# Patient Record
(demographics unavailable — no encounter records)

---

## 2024-12-23 NOTE — IMAGING
[de-identified] : The patient is a well appearing 78 year old male of their stated age. Patient ambulates with an ANTALGIC gait. Negative straight leg raise bilateral   General: in no acute distress, seated comfortably, moving easily Skin: No discoloration, rashes; on palpation skin is dry, Neuro: Normal sensation all dermatomes, motor all myotomes Vascular: Normal pulses, no edema, normal temperature Coordination and balance: Normal Psych: normal mood and affect, non pressured speech, alert and oriented x3   BILATERAL Knee:                        ROM:  3-120 degrees   Lachman: Negative Pivot Shift: Negative Anterior Drawer: Negative Posterior Drawer / Sag: Negative Varus Stress 0 degrees: Stable Varus Stress 30 degrees: Stable Valgus Stress 0 degrees: Stable Valgus Stress 30 degrees: Stable Medial Harley: Positive Lateral Harley: Negative Patella Glide: 2+ Patella Apprehension: Negative Patella Grind: Positive   Palpation: Medial Joint Line: TTP Lateral Joint Line: TTP Medial Collateral Ligament: Nontender Lateral Collateral Ligament/PLC: Nontender Distal Femur: Nontender Proximal Tibia: Nontender Tibial Tubercle: Nontender Distal Pole Patella: Nontender Quadriceps Tendon: Nontender &  Intact Patella Tendon: Nontender &  Intact Medial Distal Hamstring/PES: Nontender Lateral Distal Hamstring: Nontender & Stable Iliotibial Band: Nontender Medial Patellofemoral Ligament: Nontender Adductor: Nontender Proximal GSC-Plantaris: Nontender Calf: Supple & Nontender   Inspection: Deformity: No Erythema: No Ecchymosis: No Abrasions: No Effusion: Moderate Prepatellar Bursitis: No   Neurologic Exam: Sensation L4-S1: Grossly Intact   Motor Exam: Quadriceps: 5 out of 5 Hamstrings: 5 out of 5 EHL: 5 out of 5 FHL: 5 out of 5 TA: 5 out of 5 GS: 5 out of 5   Circulatory/Pulses: Dorsalis Pedis: 2+ Posterior Tibialis: 2+     X-RAYS of the BILATERAL KNEE (4 views including AP, Merchant, Tunnel, Lateral): moderate osteoarthritic changes, with spurring, joint space narrowing, subchondral cysts; no fracture or dislocation

## 2024-12-23 NOTE — DISCUSSION/SUMMARY
[de-identified] : Instructions: Dx / Natural History The patient was advised of the diagnosis.  The natural history of the pathology was explained in full to the patient in layman's terms.  Several different treatment options were discussed and explained in full to the patient including the risks and benefits of both surgical and non-surgical treatments.  All questions and concerns were answered.   RICE I explained to the patient that rest, ice, compression, and elevation would benefit them.  They may return to activity after follow-up or when they no longer have any pain.  NSAIDs - OTC Patient is to begin over the counter oral anti-inflammatory medications on an as needed basis, as long as there are no medical contraindications.  Patient is counseled on possible GI and blood pressure side effects.  Pain Guide Activities The patient was advised to let pain guide the gradual advancement of activities.  Icing The patient was advised to apply ice (wrapped in a towel or protective covering) to the area daily (20 minutes at a time, 2-4X/day).  All of the patient's questions were answered to His satisfaction. Diagnoses and potential treatments were reviewed. He agreed with the plan and would like to move forward with it.  1. Euflexxa #1 bilateral knees tolerated well.  2. Cryotherapy  Follow up in 1 week for inj #2

## 2024-12-23 NOTE — HISTORY OF PRESENT ILLNESS
[de-identified] : 12/23/2024: Pt here today for follow-up for bilateral knee pain. Pain and symptoms are the same. Patient requesting to proceed with injection today.   2/5/24: Patient here for follow up of B/L knees and Euflexxa inj #2 today.   1/29/2024: The patient is a 78 year old RHD M who presents today complaining of carmelina knee pain. Date of Injury/Onset: 2+ years Pain: At Rest: 1/10 With Activity: 7-8/10 Mechanism of injury: No specific cause of injury/ trauma Quality of symptoms: Sharp Improves with: Gel injection/ NSAIDS Worse with: Sitting Treatment/ Imaging/ Studies since last visit: None Occupation: Works 1x day a week. operations Additional Information: Finished Euflexxa series on on 8/28/23 8/28/2023: Euflexxa #3 Bilateral Knee  8/21/2024: Euflexxa #2 Bilateral Knee.  8/14/2024: The patient is a 78 year old RHD M who presents today complaining of carmelina knee pain. Date of Injury/Onset: 2+ years Pain: At Rest: 1/10 With Activity: 7-8/10 Mechanism of injury: No specific cause of injury/ trauma Quality of symptoms: Sharp Improves with: Gel injection/ NSAIDS Worse with: Sitting Prior treatment/ Imaging: Gel Injection/ Last seen 2/14/23 for right knee only. Occupation: Works 1x day a week. operations Additional Information: [None]

## 2024-12-23 NOTE — PROCEDURE
[Large Joint Injection] : Large joint injection [Bilateral] : bilaterally of the [Knee] : knee [Pain] : pain [Inflammation] : inflammation [X-ray evidence of Osteoarthritis on this or prior visit] : x-ray evidence of Osteoarthritis on this or prior visit [Alcohol] : alcohol [Betadine] : betadine [Ethyl Chloride sprayed topically] : ethyl chloride sprayed topically [Sterile technique used] : sterile technique used [Euflexxa(20mg)] : 20mg of Euflexxa [] : Patient tolerated procedure well [Call if redness, pain or fever occur] : call if redness, pain or fever occur [Apply ice for 15min out of every hour for the next 12-24 hours as tolerated] : apply ice for 15 minutes out of every hour for the next 12-24 hours as tolerated [Previous OTC use and PT nontherapeutic] : patient has tried OTC's including aspirin, Ibuprofen, Aleve, etc or prescription NSAIDS, and/or exercises at home and/or physical therapy without satisfactory response [Patient had decreased mobility in the joint] : patient had decreased mobility in the joint [Risks, benefits, alternatives discussed / Verbal consent obtained] : the risks benefits, and alternatives have been discussed, and verbal consent was obtained [Altered anatomic landmarks d/t erosive arthritis] : altered anatomic landmarks d/t erosive arthritis [All ultrasound images have been permanently captured and stored accordingly in our picture archiving and communication system] : All ultrasound images have been permanently captured and stored accordingly in our picture archiving and communication system [#1] : series #1

## 2025-01-06 NOTE — HISTORY OF PRESENT ILLNESS
[de-identified] : 01/06/2025 : Patient is presenting today for f/u bilateral knees. Euflexxa Injection # 2 today. No new issues. Patient denies any numbness/tingling/fevers/chills.  12/23/2024: Pt here today for follow-up for bilateral knee pain. Pain and symptoms are the same. Patient requesting to proceed with injection today.   2/5/24: Patient here for follow up of B/L knees and Euflexxa inj #2 today.   1/29/2024: The patient is a 78 year old RHD M who presents today complaining of carmelina knee pain. Date of Injury/Onset: 2+ years Pain: At Rest: 1/10 With Activity: 7-8/10 Mechanism of injury: No specific cause of injury/ trauma Quality of symptoms: Sharp Improves with: Gel injection/ NSAIDS Worse with: Sitting Treatment/ Imaging/ Studies since last visit: None Occupation: Works 1x day a week. operations Additional Information: Finished Euflexxa series on on 8/28/23 8/28/2023: Euflexxa #3 Bilateral Knee  8/21/2024: Euflexxa #2 Bilateral Knee.  8/14/2024: The patient is a 78 year old RHD M who presents today complaining of carmelina knee pain. Date of Injury/Onset: 2+ years Pain: At Rest: 1/10 With Activity: 7-8/10 Mechanism of injury: No specific cause of injury/ trauma Quality of symptoms: Sharp Improves with: Gel injection/ NSAIDS Worse with: Sitting Prior treatment/ Imaging: Gel Injection/ Last seen 2/14/23 for right knee only. Occupation: Works 1x day a week. operations Additional Information: [None]

## 2025-01-06 NOTE — PROCEDURE
[Large Joint Injection] : Large joint injection [Bilateral] : bilaterally of the [Knee] : knee [Pain] : pain [Inflammation] : inflammation [X-ray evidence of Osteoarthritis on this or prior visit] : x-ray evidence of Osteoarthritis on this or prior visit [Alcohol] : alcohol [Betadine] : betadine [Ethyl Chloride sprayed topically] : ethyl chloride sprayed topically [Sterile technique used] : sterile technique used [Euflexxa(20mg)] : 20mg of Euflexxa [#2] : series #2 [] : Patient tolerated procedure well [Call if redness, pain or fever occur] : call if redness, pain or fever occur [Apply ice for 15min out of every hour for the next 12-24 hours as tolerated] : apply ice for 15 minutes out of every hour for the next 12-24 hours as tolerated [Previous OTC use and PT nontherapeutic] : patient has tried OTC's including aspirin, Ibuprofen, Aleve, etc or prescription NSAIDS, and/or exercises at home and/or physical therapy without satisfactory response [Patient had decreased mobility in the joint] : patient had decreased mobility in the joint [Risks, benefits, alternatives discussed / Verbal consent obtained] : the risks benefits, and alternatives have been discussed, and verbal consent was obtained [Altered anatomic landmarks d/t erosive arthritis] : altered anatomic landmarks d/t erosive arthritis [All ultrasound images have been permanently captured and stored accordingly in our picture archiving and communication system] : All ultrasound images have been permanently captured and stored accordingly in our picture archiving and communication system

## 2025-01-06 NOTE — IMAGING
[de-identified] : The patient is a well appearing 78 year old male of their stated age. Patient ambulates with an ANTALGIC gait. Negative straight leg raise bilateral   General: in no acute distress, seated comfortably, moving easily Skin: No discoloration, rashes; on palpation skin is dry, Neuro: Normal sensation all dermatomes, motor all myotomes Vascular: Normal pulses, no edema, normal temperature Coordination and balance: Normal Psych: normal mood and affect, non pressured speech, alert and oriented x3   BILATERAL Knee:                        ROM:  3-120 degrees   Lachman: Negative Pivot Shift: Negative Anterior Drawer: Negative Posterior Drawer / Sag: Negative Varus Stress 0 degrees: Stable Varus Stress 30 degrees: Stable Valgus Stress 0 degrees: Stable Valgus Stress 30 degrees: Stable Medial Harley: Positive Lateral Harley: Negative Patella Glide: 2+ Patella Apprehension: Negative Patella Grind: Positive   Palpation: Medial Joint Line: TTP Lateral Joint Line: TTP Medial Collateral Ligament: Nontender Lateral Collateral Ligament/PLC: Nontender Distal Femur: Nontender Proximal Tibia: Nontender Tibial Tubercle: Nontender Distal Pole Patella: Nontender Quadriceps Tendon: Nontender &  Intact Patella Tendon: Nontender &  Intact Medial Distal Hamstring/PES: Nontender Lateral Distal Hamstring: Nontender & Stable Iliotibial Band: Nontender Medial Patellofemoral Ligament: Nontender Adductor: Nontender Proximal GSC-Plantaris: Nontender Calf: Supple & Nontender   Inspection: Deformity: No Erythema: No Ecchymosis: No Abrasions: No Effusion: Moderate Prepatellar Bursitis: No   Neurologic Exam: Sensation L4-S1: Grossly Intact   Motor Exam: Quadriceps: 5 out of 5 Hamstrings: 5 out of 5 EHL: 5 out of 5 FHL: 5 out of 5 TA: 5 out of 5 GS: 5 out of 5   Circulatory/Pulses: Dorsalis Pedis: 2+ Posterior Tibialis: 2+     X-RAYS of the BILATERAL KNEE (4 views including AP, Merchant, Tunnel, Lateral): moderate osteoarthritic changes, with spurring, joint space narrowing, subchondral cysts; no fracture or dislocation

## 2025-01-06 NOTE — DISCUSSION/SUMMARY
[de-identified] : Instructions: Dx / Natural History The patient was advised of the diagnosis.  The natural history of the pathology was explained in full to the patient in layman's terms.  Several different treatment options were discussed and explained in full to the patient including the risks and benefits of both surgical and non-surgical treatments.  All questions and concerns were answered.   RICE I explained to the patient that rest, ice, compression, and elevation would benefit them.  They may return to activity after follow-up or when they no longer have any pain.  NSAIDs - OTC Patient is to begin over the counter oral anti-inflammatory medications on an as needed basis, as long as there are no medical contraindications.  Patient is counseled on possible GI and blood pressure side effects.  Pain Guide Activities The patient was advised to let pain guide the gradual advancement of activities.  Icing The patient was advised to apply ice (wrapped in a towel or protective covering) to the area daily (20 minutes at a time, 2-4X/day).  All of the patient's questions were answered to His satisfaction. Diagnoses and potential treatments were reviewed. He agreed with the plan and would like to move forward with it.  1. Euflexxa #2 bilateral knees tolerated well.  2. Cryotherapy 3. [facility] knee conditioning handout provided.  Follow up in 1 week for inj #2

## 2025-01-13 NOTE — DISCUSSION/SUMMARY
[de-identified] : Instructions: Dx / Natural History The patient was advised of the diagnosis.  The natural history of the pathology was explained in full to the patient in layman's terms.  Several different treatment options were discussed and explained in full to the patient including the risks and benefits of both surgical and non-surgical treatments.  All questions and concerns were answered.   RICE I explained to the patient that rest, ice, compression, and elevation would benefit them.  They may return to activity after follow-up or when they no longer have any pain.  NSAIDs - OTC Patient is to begin over the counter oral anti-inflammatory medications on an as needed basis, as long as there are no medical contraindications.  Patient is counseled on possible GI and blood pressure side effects.  Pain Guide Activities The patient was advised to let pain guide the gradual advancement of activities.  Icing The patient was advised to apply ice (wrapped in a towel or protective covering) to the area daily (20 minutes at a time, 2-4X/day).  All of the patient's questions were answered to His satisfaction. Diagnoses and potential treatments were reviewed. He agreed with the plan and would like to move forward with it.  1. Euflexxa #3 bilateral knees tolerated well.  2. Cryotherapy 3. [facility] knee conditioning handout provided.  Follow up as needed.

## 2025-01-13 NOTE — HISTORY OF PRESENT ILLNESS
[de-identified] : 1/13/2025: Patient presents today for bilateral knee, Euflexxa #3.  01/06/2025 : Patient is presenting today for f/u bilateral knees. Euflexxa Injection # 2 today. No new issues. Patient denies any numbness/tingling/fevers/chills.  12/23/2024: Pt here today for follow-up for bilateral knee pain. Pain and symptoms are the same. Patient requesting to proceed with injection today.   2/5/24: Patient here for follow up of B/L knees and Euflexxa inj #2 today.   1/29/2024: The patient is a 78 year old RHD M who presents today complaining of carmelina knee pain. Date of Injury/Onset: 2+ years Pain: At Rest: 1/10 With Activity: 7-8/10 Mechanism of injury: No specific cause of injury/ trauma Quality of symptoms: Sharp Improves with: Gel injection/ NSAIDS Worse with: Sitting Treatment/ Imaging/ Studies since last visit: None Occupation: Works 1x day a week. operations Additional Information: Finished Euflexxa series on on 8/28/23 8/28/2023: Euflexxa #3 Bilateral Knee  8/21/2024: Euflexxa #2 Bilateral Knee.  8/14/2024: The patient is a 78 year old RHD M who presents today complaining of carmelina knee pain. Date of Injury/Onset: 2+ years Pain: At Rest: 1/10 With Activity: 7-8/10 Mechanism of injury: No specific cause of injury/ trauma Quality of symptoms: Sharp Improves with: Gel injection/ NSAIDS Worse with: Sitting Prior treatment/ Imaging: Gel Injection/ Last seen 2/14/23 for right knee only. Occupation: Works 1x day a week. operations Additional Information: [None]

## 2025-01-13 NOTE — IMAGING
[de-identified] : The patient is a well appearing 78 year old male of their stated age. Patient ambulates with an ANTALGIC gait. Negative straight leg raise bilateral   General: in no acute distress, seated comfortably, moving easily Skin: No discoloration, rashes; on palpation skin is dry, Neuro: Normal sensation all dermatomes, motor all myotomes Vascular: Normal pulses, no edema, normal temperature Coordination and balance: Normal Psych: normal mood and affect, non pressured speech, alert and oriented x3   BILATERAL Knee:                        ROM:  3-120 degrees   Lachman: Negative Pivot Shift: Negative Anterior Drawer: Negative Posterior Drawer / Sag: Negative Varus Stress 0 degrees: Stable Varus Stress 30 degrees: Stable Valgus Stress 0 degrees: Stable Valgus Stress 30 degrees: Stable Medial Harley: Positive Lateral Harley: Negative Patella Glide: 2+ Patella Apprehension: Negative Patella Grind: Positive   Palpation: Medial Joint Line: TTP Lateral Joint Line: TTP Medial Collateral Ligament: Nontender Lateral Collateral Ligament/PLC: Nontender Distal Femur: Nontender Proximal Tibia: Nontender Tibial Tubercle: Nontender Distal Pole Patella: Nontender Quadriceps Tendon: Nontender &  Intact Patella Tendon: Nontender &  Intact Medial Distal Hamstring/PES: Nontender Lateral Distal Hamstring: Nontender & Stable Iliotibial Band: Nontender Medial Patellofemoral Ligament: Nontender Adductor: Nontender Proximal GSC-Plantaris: Nontender Calf: Supple & Nontender   Inspection: Deformity: No Erythema: No Ecchymosis: No Abrasions: No Effusion: Moderate Prepatellar Bursitis: No   Neurologic Exam: Sensation L4-S1: Grossly Intact   Motor Exam: Quadriceps: 5 out of 5 Hamstrings: 5 out of 5 EHL: 5 out of 5 FHL: 5 out of 5 TA: 5 out of 5 GS: 5 out of 5   Circulatory/Pulses: Dorsalis Pedis: 2+ Posterior Tibialis: 2+     X-RAYS of the BILATERAL KNEE (4 views including AP, Merchant, Tunnel, Lateral): moderate osteoarthritic changes, with spurring, joint space narrowing, subchondral cysts; no fracture or dislocation
No

## 2025-01-13 NOTE — PROCEDURE
[Large Joint Injection] : Large joint injection [Bilateral] : bilaterally of the [Knee] : knee [Pain] : pain [Inflammation] : inflammation [X-ray evidence of Osteoarthritis on this or prior visit] : x-ray evidence of Osteoarthritis on this or prior visit [Alcohol] : alcohol [Betadine] : betadine [Ethyl Chloride sprayed topically] : ethyl chloride sprayed topically [Sterile technique used] : sterile technique used [Euflexxa(20mg)] : 20mg of Euflexxa [] : Patient tolerated procedure well [Call if redness, pain or fever occur] : call if redness, pain or fever occur [Apply ice for 15min out of every hour for the next 12-24 hours as tolerated] : apply ice for 15 minutes out of every hour for the next 12-24 hours as tolerated [Previous OTC use and PT nontherapeutic] : patient has tried OTC's including aspirin, Ibuprofen, Aleve, etc or prescription NSAIDS, and/or exercises at home and/or physical therapy without satisfactory response [Patient had decreased mobility in the joint] : patient had decreased mobility in the joint [Risks, benefits, alternatives discussed / Verbal consent obtained] : the risks benefits, and alternatives have been discussed, and verbal consent was obtained [Altered anatomic landmarks d/t erosive arthritis] : altered anatomic landmarks d/t erosive arthritis [All ultrasound images have been permanently captured and stored accordingly in our picture archiving and communication system] : All ultrasound images have been permanently captured and stored accordingly in our picture archiving and communication system [#3] : series #3